# Patient Record
Sex: FEMALE | Race: WHITE | NOT HISPANIC OR LATINO | Employment: FULL TIME | URBAN - NONMETROPOLITAN AREA
[De-identification: names, ages, dates, MRNs, and addresses within clinical notes are randomized per-mention and may not be internally consistent; named-entity substitution may affect disease eponyms.]

---
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
## 2023-02-03 ENCOUNTER — OFFICE VISIT (OUTPATIENT)
Dept: FAMILY MEDICINE CLINIC | Facility: CLINIC | Age: 18
End: 2023-02-03
Payer: COMMERCIAL

## 2023-02-03 VITALS
DIASTOLIC BLOOD PRESSURE: 60 MMHG | BODY MASS INDEX: 25.44 KG/M2 | HEIGHT: 64 IN | SYSTOLIC BLOOD PRESSURE: 118 MMHG | OXYGEN SATURATION: 98 % | TEMPERATURE: 97.8 F | WEIGHT: 149 LBS | RESPIRATION RATE: 16 BRPM | HEART RATE: 97 BPM

## 2023-02-03 DIAGNOSIS — G43.009 MIGRAINE WITHOUT AURA AND WITHOUT STATUS MIGRAINOSUS, NOT INTRACTABLE: ICD-10-CM

## 2023-02-03 PROCEDURE — 99213 OFFICE O/P EST LOW 20 MIN: CPT | Performed by: NURSE PRACTITIONER

## 2023-02-03 RX ORDER — VENLAFAXINE HYDROCHLORIDE 75 MG/1
75 CAPSULE, EXTENDED RELEASE ORAL DAILY
Qty: 30 CAPSULE | Refills: 0 | Status: SHIPPED | OUTPATIENT
Start: 2023-02-03 | End: 2023-03-03

## 2023-02-03 RX ORDER — VENLAFAXINE HYDROCHLORIDE 37.5 MG/1
37.5 CAPSULE, EXTENDED RELEASE ORAL DAILY
Qty: 90 CAPSULE | Refills: 0 | Status: CANCELLED | OUTPATIENT
Start: 2023-02-03

## 2023-02-03 NOTE — PROGRESS NOTES
Chief Complaint  Migraine    Subjective          Kellie Neville is a 17 y.o. female who presents today to [facility] for follow up    HPI:   History of Present Illness    Presents to clinic for follow-up for migraines.  Is currently taking Effexor and reports this has helped some but she is still having some migraines. Migraines are less intense with Effexor.  Denies migraine or any associated symptoms at this time.  No other issues or concerns at this time.      The following portions of the patient's history were reviewed and updated as appropriate: allergies, current medications, past family history, past medical history, past social history, past surgical history and problem list.    Objective     Allergy:   Allergies   Allergen Reactions   • Grapefruit Swelling        Current Medications:   Current Outpatient Medications   Medication Sig Dispense Refill   • naproxen (Naprosyn) 500 MG tablet Take 1 tablet by mouth 2 (Two) Times a Day With Meals. (Patient taking differently: Take 500 mg by mouth 2 (Two) Times a Day As Needed.) 60 tablet 2   • venlafaxine XR (Effexor XR) 75 MG 24 hr capsule Take 1 capsule by mouth Daily. 30 capsule 0     No current facility-administered medications for this visit.       Past Medical History:  Past Medical History:   Diagnosis Date   • Migraine        Past Surgical History:  Past Surgical History:   Procedure Laterality Date   • NO PAST SURGERIES         Social History:  Social History     Socioeconomic History   • Marital status: Single   Tobacco Use   • Smoking status: Never   • Smokeless tobacco: Never   Vaping Use   • Vaping Use: Every day   • Substances: Nicotine, Flavoring   Substance and Sexual Activity   • Alcohol use: No   • Drug use: No   • Sexual activity: Defer       Family History:  Family History   Problem Relation Age of Onset   • No Known Problems Mother    • No Known Problems Father    • Diabetes Maternal Grandfather        Review  "of Systems:  Review of Systems   Eyes: Negative for visual disturbance.   Neurological: Negative for headaches.   Psychiatric/Behavioral: Negative for dysphoric mood, self-injury and suicidal ideas. The patient is not nervous/anxious.        Vital Signs:   /60 (BP Location: Right arm, Patient Position: Sitting, Cuff Size: Large Adult)   Pulse 97   Temp 97.8 °F (36.6 °C) (Temporal)   Resp 16   Ht 161.3 cm (63.5\")   Wt 67.6 kg (149 lb)   SpO2 98%   BMI 25.98 kg/m²      Physical Exam:  Physical Exam  Vitals and nursing note reviewed.   Constitutional:       General: She is not in acute distress.     Appearance: Normal appearance. She is not ill-appearing or toxic-appearing.   HENT:      Head: Normocephalic.   Eyes:      Pupils: Pupils are equal, round, and reactive to light.   Cardiovascular:      Rate and Rhythm: Normal rate and regular rhythm.      Heart sounds: Normal heart sounds.   Pulmonary:      Effort: Pulmonary effort is normal. No respiratory distress.      Breath sounds: Normal breath sounds.   Skin:     General: Skin is warm and dry.      Coloration: Skin is not pale.   Neurological:      General: No focal deficit present.      Mental Status: She is alert and oriented to person, place, and time.   Psychiatric:         Mood and Affect: Mood normal.         Behavior: Behavior normal.         Thought Content: Thought content normal.         Judgment: Judgment normal.                  Assessment and Plan   Diagnoses and all orders for this visit:    1. Migraine without aura and without status migrainosus, not intractable  -     venlafaxine XR (Effexor XR) 75 MG 24 hr capsule; Take 1 capsule by mouth Daily.  Dispense: 30 capsule; Refill: 0    Effexor dose increased to 75 mg daily for prophylactic treatment of migraines.    Discussed possible differential diagnoses, testing, treatment, recommended non-pharmacological interventions, risks, warning signs to monitor for that would indicate need for " follow-up in clinic or ER. If no improvement with these regimens or you have new or worsening symptoms follow-up. Patient verbalizes understanding and agreement with plan of care. Denies further needs or concerns.     Patient was given instructions and counseling regarding her condition and for health maintenance advised.      I spent 20 minutes caring for patient on this date of service. This time includes time spent by me in the following activities: preparing for the visit, reviewing tests, obtaining and/or reviewing a separately obtained history, performing a medically appropriate examination and/or evaluation, counseling and educating the patient/family/caregiver, ordering medications, tests, or procedures and documenting information in the medical record    Meds ordered during this visit:  New Medications Ordered This Visit   Medications   • venlafaxine XR (Effexor XR) 75 MG 24 hr capsule     Sig: Take 1 capsule by mouth Daily.     Dispense:  30 capsule     Refill:  0       Patient Instructions:  Patient instructions given for the following visit diagnosis:    ICD-10-CM ICD-9-CM   1. Migraine without aura and without status migrainosus, not intractable  G43.009 346.10       Follow Up   Return in about 1 month (around 3/3/2023) for Recheck, Sooner if needed.        This document has been electronically signed by FANG Hewitt  February 3, 2023 08:51 EST    Patient was given instructions and counseling regarding her condition or for health maintenance advice. Please see specific information pulled into the AVS if appropriate.     Part of this note may be an electronic transcription/translation of spoken language to printed text using the Dragon Dictation System.

## 2023-02-10 ENCOUNTER — OFFICE VISIT (OUTPATIENT)
Dept: FAMILY MEDICINE CLINIC | Facility: CLINIC | Age: 18
End: 2023-02-10
Payer: COMMERCIAL

## 2023-02-10 VITALS
HEART RATE: 83 BPM | HEIGHT: 64 IN | BODY MASS INDEX: 25.44 KG/M2 | DIASTOLIC BLOOD PRESSURE: 54 MMHG | SYSTOLIC BLOOD PRESSURE: 96 MMHG | WEIGHT: 149 LBS | RESPIRATION RATE: 16 BRPM | TEMPERATURE: 98.6 F | OXYGEN SATURATION: 98 %

## 2023-02-10 DIAGNOSIS — N92.6 MISSED PERIOD: ICD-10-CM

## 2023-02-10 DIAGNOSIS — R10.9 LEFT FLANK PAIN: ICD-10-CM

## 2023-02-10 DIAGNOSIS — Z30.431 ENCOUNTER FOR ROUTINE CHECKING OF INTRAUTERINE CONTRACEPTIVE DEVICE (IUD): Primary | ICD-10-CM

## 2023-02-10 LAB
B-HCG UR QL: NEGATIVE
BILIRUB BLD-MCNC: NEGATIVE MG/DL
CLARITY, POC: CLEAR
COLOR UR: YELLOW
EXPIRATION DATE: NORMAL
GLUCOSE UR STRIP-MCNC: NEGATIVE MG/DL
INTERNAL NEGATIVE CONTROL: NORMAL
INTERNAL POSITIVE CONTROL: NORMAL
KETONES UR QL: NEGATIVE
LEUKOCYTE EST, POC: NEGATIVE
Lab: NORMAL
NITRITE UR-MCNC: NEGATIVE MG/ML
PH UR: 6 [PH] (ref 5–8)
PROT UR STRIP-MCNC: NEGATIVE MG/DL
RBC # UR STRIP: NEGATIVE /UL
SP GR UR: 1.01 (ref 1–1.03)
UROBILINOGEN UR QL: NORMAL

## 2023-02-10 PROCEDURE — 81025 URINE PREGNANCY TEST: CPT | Performed by: NURSE PRACTITIONER

## 2023-02-10 PROCEDURE — 99214 OFFICE O/P EST MOD 30 MIN: CPT | Performed by: NURSE PRACTITIONER

## 2023-02-10 NOTE — PROGRESS NOTES
Chief Complaint  Contraception    Subjective          Kellie Neville is a 17 y.o. female who presents today to [facility] for initial evaluation     HPI:   History of Present Illness    Presents for follow up for IUD check. Reports she had a period in November and December but has not had one since. Reports she has not been able to feel her IUD strings. Had one episode of left flank pain yesterday but it only lasted a few seconds and went away. Denies any other associated symptoms. No other issues or concerns at this time.     The following portions of the patient's history were reviewed and updated as appropriate: allergies, current medications, past family history, past medical history, past social history, past surgical history and problem list.    Objective     Allergy:   Allergies   Allergen Reactions   • Grapefruit Swelling        Current Medications:   Current Outpatient Medications   Medication Sig Dispense Refill   • naproxen (Naprosyn) 500 MG tablet Take 1 tablet by mouth 2 (Two) Times a Day With Meals. (Patient taking differently: Take 500 mg by mouth 2 (Two) Times a Day As Needed.) 60 tablet 2   • venlafaxine XR (Effexor XR) 75 MG 24 hr capsule Take 1 capsule by mouth Daily. 30 capsule 0     No current facility-administered medications for this visit.       Past Medical History:  Past Medical History:   Diagnosis Date   • Migraine        Past Surgical History:  Past Surgical History:   Procedure Laterality Date   • NO PAST SURGERIES         Social History:  Social History     Socioeconomic History   • Marital status: Single   Tobacco Use   • Smoking status: Never   • Smokeless tobacco: Never   Vaping Use   • Vaping Use: Every day   • Substances: Nicotine, Flavoring   Substance and Sexual Activity   • Alcohol use: No   • Drug use: No   • Sexual activity: Defer       Family History:  Family History   Problem Relation Age of Onset   • No Known Problems Mother    • No Known  "Problems Father    • Diabetes Maternal Grandfather        Review of Systems:  Review of Systems   Gastrointestinal: Negative for abdominal pain.   Genitourinary: Negative for pelvic pain, vaginal discharge and vaginal pain.       Vital Signs:   BP 96/54 (BP Location: Right arm, Patient Position: Sitting, Cuff Size: Large Adult)   Pulse 83   Temp 98.6 °F (37 °C) (Temporal)   Resp 16   Ht 161.3 cm (63.5\")   Wt 67.6 kg (149 lb)   SpO2 98%   BMI 25.98 kg/m²      Physical Exam:  Physical Exam  Vitals and nursing note reviewed. Exam conducted with a chaperone present.   Constitutional:       General: She is not in acute distress.     Appearance: Normal appearance. She is not ill-appearing or toxic-appearing.   HENT:      Head: Normocephalic.   Cardiovascular:      Rate and Rhythm: Normal rate and regular rhythm.      Heart sounds: Normal heart sounds.   Pulmonary:      Effort: Pulmonary effort is normal. No respiratory distress.      Breath sounds: Normal breath sounds.   Abdominal:      General: Bowel sounds are normal.      Palpations: Abdomen is soft.   Genitourinary:     General: Normal vulva.      Vagina: Normal.      Cervix: Normal.      Uterus: Normal.       Adnexa: Right adnexa normal.      Comments: IDU strings not visible   Skin:     General: Skin is warm and dry.      Coloration: Skin is not pale.   Neurological:      Mental Status: She is alert and oriented to person, place, and time.   Psychiatric:         Mood and Affect: Mood normal.         Behavior: Behavior normal.         Thought Content: Thought content normal.         Judgment: Judgment normal.           Pelvic exam completed without incident. Chaperone present for exam.       Assessment and Plan   Diagnoses and all orders for this visit:    1. Encounter for routine checking of intrauterine contraceptive device (IUD) (Primary)  -     US Non-ob Transvaginal; Future  -     POCT pregnancy, urine    2. Missed period  -     POCT pregnancy, " urine    3. Left flank pain  -     POCT pregnancy, urine  -     POC Urinalysis Dipstick      IUD strings not visible. Will proceed with US for further evaluation of IUD placement.  Flank pain resolved.  Follow-up if symptoms return. Declines further testing.     Discussed possible differential diagnoses, testing, treatment, recommended non-pharmacological interventions, risks, warning signs to monitor for that would indicate need for follow-up in clinic or ER. If no improvement with these regimens or you have new or worsening symptoms follow-up. Patient verbalizes understanding and agreement with plan of care. Denies further needs or concerns.     Patient was given instructions and counseling regarding her condition and for health maintenance advised.    I spent 30 minutes caring for patient on this date of service. This time includes time spent by me in the following activities: preparing for the visit, reviewing tests, obtaining and/or reviewing a separately obtained history, performing a medically appropriate examination and/or evaluation, counseling and educating the patient/family/caregiver, ordering medications, tests, or procedures and documenting information in the medical record    Meds ordered during this visit:  No orders of the defined types were placed in this encounter.      Patient Instructions:  Patient instructions given for the following visit diagnosis:    ICD-10-CM ICD-9-CM   1. Encounter for routine checking of intrauterine contraceptive device (IUD)  Z30.431 V25.42   2. Missed period  N92.6 626.4   3. Left flank pain  R10.9 789.09       Follow Up   Return if symptoms worsen or fail to improve.        This document has been electronically signed by FANG Hewitt  February 10, 2023 12:35 EST    Patient was given instructions and counseling regarding her condition or for health maintenance advice. Please see specific information pulled into the AVS if appropriate.     Part of this note may be  an electronic transcription/translation of spoken language to printed text using the Dragon Dictation System.

## 2023-03-02 DIAGNOSIS — G43.809 OTHER MIGRAINE WITHOUT STATUS MIGRAINOSUS, NOT INTRACTABLE: ICD-10-CM

## 2023-03-02 RX ORDER — NAPROXEN 500 MG/1
TABLET ORAL
Qty: 60 TABLET | Refills: 2 | Status: SHIPPED | OUTPATIENT
Start: 2023-03-02

## 2023-03-02 RX ORDER — ONDANSETRON 4 MG/1
4 TABLET, ORALLY DISINTEGRATING ORAL EVERY 8 HOURS PRN
Qty: 15 TABLET | Refills: 0 | Status: SHIPPED | OUTPATIENT
Start: 2023-03-02

## 2023-03-02 NOTE — H&P
Patient presented complaining of nausea that occurs with taking her Effexor.  Reports this started since the dose was increased.  Requesting Zofran.  Denies any associated symptoms.  No other issues or concerns.  Does not wish to change medication at this time.  If no improvement of symptoms, follow-up for further evaluation/management.

## 2023-03-03 ENCOUNTER — TELEPHONE (OUTPATIENT)
Dept: FAMILY MEDICINE CLINIC | Facility: CLINIC | Age: 18
End: 2023-03-03

## 2023-03-03 ENCOUNTER — OFFICE VISIT (OUTPATIENT)
Dept: FAMILY MEDICINE CLINIC | Facility: CLINIC | Age: 18
End: 2023-03-03
Payer: COMMERCIAL

## 2023-03-03 VITALS
BODY MASS INDEX: 25.61 KG/M2 | SYSTOLIC BLOOD PRESSURE: 108 MMHG | HEIGHT: 64 IN | WEIGHT: 150 LBS | HEART RATE: 86 BPM | RESPIRATION RATE: 16 BRPM | OXYGEN SATURATION: 99 % | TEMPERATURE: 98 F | DIASTOLIC BLOOD PRESSURE: 60 MMHG

## 2023-03-03 DIAGNOSIS — R11.0 NAUSEA: ICD-10-CM

## 2023-03-03 DIAGNOSIS — G43.809 OTHER MIGRAINE WITHOUT STATUS MIGRAINOSUS, NOT INTRACTABLE: Primary | ICD-10-CM

## 2023-03-03 PROCEDURE — 99214 OFFICE O/P EST MOD 30 MIN: CPT | Performed by: NURSE PRACTITIONER

## 2023-03-03 RX ORDER — VENLAFAXINE HYDROCHLORIDE 37.5 MG/1
37.5 CAPSULE, EXTENDED RELEASE ORAL DAILY
Qty: 90 CAPSULE | Refills: 0 | Status: SHIPPED | OUTPATIENT
Start: 2023-03-03

## 2023-03-03 NOTE — PROGRESS NOTES
Chief Complaint  Migraine (Follow Up)    Subjective          Kellie Neville is a 17 y.o. female who presents today to [facility] for follow up    HPI:   History of Present Illness    Presents to clinic for follow-up for migraine.  Is currently treated with Effexor 75 mg daily.  Reports this has started to make her feel dizzy and nauseous.  Denies any other associated symptoms.  Reports she has not taken this today and feels better.  Took Zofran yesterday which helped with nausea.  No other issues or concerns at this time.      The following portions of the patient's history were reviewed and updated as appropriate: allergies, current medications, past family history, past medical history, past social history, past surgical history and problem list.    Objective     Allergy:   Allergies   Allergen Reactions   • Grapefruit Swelling        Current Medications:   Current Outpatient Medications   Medication Sig Dispense Refill   • naproxen (NAPROSYN) 500 MG tablet TAKE ONE TABLET BY MOUTH TWICE DAILY WITH MEALS FOR PAIN AND INFLAMMATION 60 tablet 2   • ondansetron ODT (ZOFRAN-ODT) 4 MG disintegrating tablet Place 1 tablet on the tongue Every 8 (Eight) Hours As Needed for Nausea or Vomiting. 15 tablet 0   • venlafaxine XR (Effexor XR) 37.5 MG 24 hr capsule Take 1 capsule by mouth Daily. 90 capsule 0     No current facility-administered medications for this visit.       Past Medical History:  Past Medical History:   Diagnosis Date   • Migraine        Past Surgical History:  Past Surgical History:   Procedure Laterality Date   • NO PAST SURGERIES         Social History:  Social History     Socioeconomic History   • Marital status: Single   Tobacco Use   • Smoking status: Never   • Smokeless tobacco: Never   Vaping Use   • Vaping Use: Every day   • Substances: Nicotine, Flavoring   Substance and Sexual Activity   • Alcohol use: No   • Drug use: No   • Sexual activity: Defer       Family  "History:  Family History   Problem Relation Age of Onset   • No Known Problems Mother    • No Known Problems Father    • Diabetes Maternal Grandfather        Review of Systems:  Review of Systems   Constitutional: Negative for fever.   Gastrointestinal: Negative for abdominal pain, constipation, diarrhea and vomiting.   Neurological: Negative for syncope.       Vital Signs:   /60 (BP Location: Right arm, Patient Position: Sitting, Cuff Size: Adult)   Pulse 86   Temp 98 °F (36.7 °C) (Temporal)   Resp 16   Ht 161.3 cm (63.5\")   Wt 68 kg (150 lb)   SpO2 99%   BMI 26.15 kg/m²      Physical Exam:  Physical Exam  Vitals and nursing note reviewed.   Constitutional:       General: She is not in acute distress.     Appearance: Normal appearance. She is not ill-appearing or toxic-appearing.   HENT:      Head: Normocephalic.   Cardiovascular:      Rate and Rhythm: Normal rate and regular rhythm.      Heart sounds: Normal heart sounds.   Pulmonary:      Effort: Pulmonary effort is normal. No respiratory distress.      Breath sounds: Normal breath sounds.   Abdominal:      General: Bowel sounds are normal.      Palpations: Abdomen is soft.   Skin:     General: Skin is warm and dry.      Coloration: Skin is not pale.   Neurological:      General: No focal deficit present.      Mental Status: She is alert and oriented to person, place, and time.   Psychiatric:         Mood and Affect: Mood normal.         Behavior: Behavior normal.         Thought Content: Thought content normal.         Judgment: Judgment normal.                  Assessment and Plan   Diagnoses and all orders for this visit:    1. Other migraine without status migrainosus, not intractable (Primary)  -     venlafaxine XR (Effexor XR) 37.5 MG 24 hr capsule; Take 1 capsule by mouth Daily.  Dispense: 90 capsule; Refill: 0    2. Nausea    Patient would like to do trial of decreasing Effexor to 37.5 mg daily.  Resolved at this time.  Brat diet, PRN. " Declines any testing.  Follow-up if symptoms return.    Discussed possible differential diagnoses, testing, treatment, recommended non-pharmacological interventions, risks, warning signs to monitor for that would indicate need for follow-up in clinic or ER. If no improvement with these regimens or you have new or worsening symptoms follow-up. Patient verbalizes understanding and agreement with plan of care. Denies further needs or concerns.     Patient was given instructions and counseling regarding her condition and for health maintenance advised.    I spent 30 minutes caring for patient on this date of service. This time includes time spent by me in the following activities: preparing for the visit, reviewing tests, obtaining and/or reviewing a separately obtained history, performing a medically appropriate examination and/or evaluation, counseling and educating the patient/family/caregiver, ordering medications, tests, or procedures and documenting information in the medical record    Meds ordered during this visit:  New Medications Ordered This Visit   Medications   • venlafaxine XR (Effexor XR) 37.5 MG 24 hr capsule     Sig: Take 1 capsule by mouth Daily.     Dispense:  90 capsule     Refill:  0       Patient Instructions:  Patient instructions given for the following visit diagnosis:    ICD-10-CM ICD-9-CM   1. Other migraine without status migrainosus, not intractable  G43.809 346.80   2. Nausea  R11.0 787.02       Follow Up   Return in about 1 month (around 4/3/2023) for Recheck, Sooner if needed.        This document has been electronically signed by FANG Hewitt  March 3, 2023 10:06 EST    Patient was given instructions and counseling regarding her condition or for health maintenance advice. Please see specific information pulled into the AVS if appropriate.     Part of this note may be an electronic transcription/translation of spoken language to printed text using the Dragon Dictation System.

## 2023-04-07 ENCOUNTER — OFFICE VISIT (OUTPATIENT)
Dept: FAMILY MEDICINE CLINIC | Facility: CLINIC | Age: 18
End: 2023-04-07
Payer: COMMERCIAL

## 2023-04-07 VITALS
DIASTOLIC BLOOD PRESSURE: 58 MMHG | HEIGHT: 64 IN | OXYGEN SATURATION: 100 % | WEIGHT: 152.6 LBS | TEMPERATURE: 98.4 F | BODY MASS INDEX: 26.05 KG/M2 | SYSTOLIC BLOOD PRESSURE: 104 MMHG | RESPIRATION RATE: 16 BRPM | HEART RATE: 80 BPM

## 2023-04-07 DIAGNOSIS — M25.473 ANKLE SWELLING, UNSPECIFIED LATERALITY: Primary | ICD-10-CM

## 2023-04-07 DIAGNOSIS — G43.809 OTHER MIGRAINE WITHOUT STATUS MIGRAINOSUS, NOT INTRACTABLE: ICD-10-CM

## 2023-04-07 DIAGNOSIS — R11.0 NAUSEA: ICD-10-CM

## 2023-04-07 PROCEDURE — 99214 OFFICE O/P EST MOD 30 MIN: CPT | Performed by: NURSE PRACTITIONER

## 2023-04-07 PROCEDURE — 1160F RVW MEDS BY RX/DR IN RCRD: CPT | Performed by: NURSE PRACTITIONER

## 2023-04-07 PROCEDURE — 1159F MED LIST DOCD IN RCRD: CPT | Performed by: NURSE PRACTITIONER

## 2023-04-07 NOTE — PROGRESS NOTES
Chief Complaint  Ankle Swelling    Subjective          Kellie Neville is a 17 y.o. female who presents today to [facility] for follow up    HPI:   History of Present Illness      Presents for complaint of swelling to ankles. Reports this has been going on for a little while since she started working. Swelling comes on throughout the day and is better in the morning. Is the same in both ankles. Denies any associated symptoms or swelling elsewhere.      Effexor was decreased to 37.5 on 3/3/2023 due to it causing dizziness.  Today patient reports she is tolerating medication well with no issues or side effects.  Reports migraines are controlled with current regimen.  Nausea dizziness have resolved. Denies any other issues or concerns at this time.      The following portions of the patient's history were reviewed and updated as appropriate: allergies, current medications, past family history, past medical history, past social history, past surgical history and problem list.    Objective     Allergy:   Allergies   Allergen Reactions   • Grapefruit Swelling        Current Medications:   Current Outpatient Medications   Medication Sig Dispense Refill   • naproxen (NAPROSYN) 500 MG tablet TAKE ONE TABLET BY MOUTH TWICE DAILY WITH MEALS FOR PAIN AND INFLAMMATION 60 tablet 2   • ondansetron ODT (ZOFRAN-ODT) 4 MG disintegrating tablet Place 1 tablet on the tongue Every 8 (Eight) Hours As Needed for Nausea or Vomiting. 15 tablet 0   • venlafaxine XR (Effexor XR) 37.5 MG 24 hr capsule Take 1 capsule by mouth Daily. 90 capsule 0     No current facility-administered medications for this visit.       Past Medical History:  Past Medical History:   Diagnosis Date   • Migraine        Past Surgical History:  Past Surgical History:   Procedure Laterality Date   • NO PAST SURGERIES         Social History:  Social History     Socioeconomic History   • Marital status: Single   Tobacco Use   • Smoking  "status: Never   • Smokeless tobacco: Never   Vaping Use   • Vaping Use: Every day   • Substances: Nicotine, Flavoring   Substance and Sexual Activity   • Alcohol use: No   • Drug use: No   • Sexual activity: Defer       Family History:  Family History   Problem Relation Age of Onset   • No Known Problems Mother    • No Known Problems Father    • Diabetes Maternal Grandfather        Review of Systems:  Review of Systems   Constitutional: Negative for unexpected weight change.   Respiratory: Negative for shortness of breath.    Gastrointestinal: Negative for abdominal distention.   Genitourinary: Negative for difficulty urinating.   Psychiatric/Behavioral: Negative for self-injury and suicidal ideas.       Vital Signs:   /58 (BP Location: Right arm, Patient Position: Sitting, Cuff Size: Adult)   Pulse 80   Temp 98.4 °F (36.9 °C) (Temporal)   Resp 16   Ht 161.3 cm (63.5\")   Wt 69.2 kg (152 lb 9.6 oz)   SpO2 100%   BMI 26.61 kg/m²      Physical Exam:  Physical Exam  Vitals and nursing note reviewed.   Constitutional:       General: She is not in acute distress.     Appearance: Normal appearance. She is not ill-appearing or toxic-appearing.   HENT:      Head: Normocephalic.   Neck:      Vascular: No JVD.   Cardiovascular:      Rate and Rhythm: Normal rate and regular rhythm.      Pulses: Normal pulses.      Heart sounds: Normal heart sounds. No murmur heard.  Pulmonary:      Effort: Pulmonary effort is normal. No respiratory distress.      Breath sounds: Normal breath sounds.   Abdominal:      General: Bowel sounds are normal.      Palpations: Abdomen is soft.   Musculoskeletal:         General: No swelling.   Skin:     General: Skin is warm and dry.      Capillary Refill: Capillary refill takes less than 2 seconds.      Coloration: Skin is not pale.   Neurological:      Mental Status: She is alert and oriented to person, place, and time.   Psychiatric:         Mood and Affect: Mood normal.         Behavior: " Behavior normal.         Thought Content: Thought content normal.         Judgment: Judgment normal.                  Assessment and Plan   Diagnoses and all orders for this visit:    1. Ankle swelling, unspecified laterality (Primary)    2. Other migraine without status migrainosus, not intractable    3. Nausea    Low-sodium diet, compression stockings, elevate legs    Continue current regimen for migraines    Follow-up if symptoms return.    Discussed possible differential diagnoses, testing, treatment, recommended non-pharmacological interventions, risks, warning signs to monitor for that would indicate need for follow-up in clinic or ER. If no improvement with these regimens or you have new or worsening symptoms follow-up. Patient verbalizes understanding and agreement with plan of care. Denies further needs or concerns.     Patient was given instructions and counseling regarding her condition and for health maintenance advised.    BMI is >= 25 and <30. (Overweight) The following options were offered after discussion;: weight loss educational material (shared in after visit summary), exercise counseling/recommendations and nutrition counseling/recommendations       I spent 30 minutes caring for patient on this date of service. This time includes time spent by me in the following activities: preparing for the visit, reviewing tests, obtaining and/or reviewing a separately obtained history, performing a medically appropriate examination and/or evaluation, counseling and educating the patient/family/caregiver, ordering medications, tests, or procedures and documenting information in the medical record    Meds ordered during this visit:  No orders of the defined types were placed in this encounter.      Patient Instructions:  Patient instructions given for the following visit diagnosis:    ICD-10-CM ICD-9-CM   1. Ankle swelling, unspecified laterality  M25.473 719.07   2. Other migraine without status migrainosus,  not intractable  G43.809 346.80   3. Nausea  R11.0 787.02       Follow Up   Return in about 1 month (around 5/7/2023) for Recheck, Sooner if needed.        This document has been electronically signed by FANG Hewitt  April 7, 2023 08:26 EDT    Patient was given instructions and counseling regarding her condition or for health maintenance advice. Please see specific information pulled into the AVS if appropriate.     Part of this note may be an electronic transcription/translation of spoken language to printed text using the Dragon Dictation System.

## 2023-04-20 ENCOUNTER — OFFICE VISIT (OUTPATIENT)
Dept: FAMILY MEDICINE CLINIC | Facility: CLINIC | Age: 18
End: 2023-04-20
Payer: COMMERCIAL

## 2023-04-20 VITALS
TEMPERATURE: 98.4 F | SYSTOLIC BLOOD PRESSURE: 110 MMHG | WEIGHT: 153.9 LBS | BODY MASS INDEX: 26.27 KG/M2 | HEIGHT: 64 IN | HEART RATE: 92 BPM | OXYGEN SATURATION: 97 % | DIASTOLIC BLOOD PRESSURE: 64 MMHG

## 2023-04-20 DIAGNOSIS — G47.9 DIFFICULTY SLEEPING: Primary | ICD-10-CM

## 2023-04-20 PROCEDURE — 1160F RVW MEDS BY RX/DR IN RCRD: CPT | Performed by: NURSE PRACTITIONER

## 2023-04-20 PROCEDURE — 99213 OFFICE O/P EST LOW 20 MIN: CPT | Performed by: NURSE PRACTITIONER

## 2023-04-20 PROCEDURE — 1159F MED LIST DOCD IN RCRD: CPT | Performed by: NURSE PRACTITIONER

## 2023-04-20 RX ORDER — HYDROXYZINE HYDROCHLORIDE 25 MG/1
25 TABLET, FILM COATED ORAL NIGHTLY PRN
Qty: 90 TABLET | Refills: 0 | Status: SHIPPED | OUTPATIENT
Start: 2023-04-20

## 2023-04-20 NOTE — PROGRESS NOTES
Chief Complaint  Insomnia    Subjective          Kellie Neville is a 18 y.o. female who presents today to [facility] for follow up    HPI:   History of Present Illness    Presents for complaint of difficulty sleeping.  Reports this has been an ongoing issue for a long time.  Has trouble falling asleep and staying asleep.  Has not been falling asleep until around 3 AM.  Denies any associated symptoms.  Denies any other issues or concerns at this time.      The following portions of the patient's history were reviewed and updated as appropriate: allergies, current medications, past family history, past medical history, past social history, past surgical history and problem list.    Objective     Allergy:   Allergies   Allergen Reactions   • Grapefruit Swelling        Current Medications:   Current Outpatient Medications   Medication Sig Dispense Refill   • naproxen (NAPROSYN) 500 MG tablet TAKE ONE TABLET BY MOUTH TWICE DAILY WITH MEALS FOR PAIN AND INFLAMMATION 60 tablet 2   • ondansetron ODT (ZOFRAN-ODT) 4 MG disintegrating tablet Place 1 tablet on the tongue Every 8 (Eight) Hours As Needed for Nausea or Vomiting. 15 tablet 0   • venlafaxine XR (Effexor XR) 37.5 MG 24 hr capsule Take 1 capsule by mouth Daily. 90 capsule 0   • hydrOXYzine (ATARAX) 25 MG tablet Take 1 tablet by mouth At Night As Needed (sleep). 90 tablet 0     No current facility-administered medications for this visit.       Past Medical History:  Past Medical History:   Diagnosis Date   • Migraine        Past Surgical History:  Past Surgical History:   Procedure Laterality Date   • NO PAST SURGERIES         Social History:  Social History     Socioeconomic History   • Marital status: Single   Tobacco Use   • Smoking status: Never   • Smokeless tobacco: Never   Vaping Use   • Vaping Use: Every day   • Substances: Nicotine, Flavoring   Substance and Sexual Activity   • Alcohol use: No   • Drug use: No   • Sexual  "activity: Defer       Family History:  Family History   Problem Relation Age of Onset   • No Known Problems Mother    • No Known Problems Father    • Diabetes Maternal Grandfather        Review of Systems:  Review of Systems   Cardiovascular: Negative for leg swelling.   Gastrointestinal: Negative for nausea and vomiting.   Psychiatric/Behavioral: Negative for self-injury and suicidal ideas.       Vital Signs:   /64 (BP Location: Right arm, Patient Position: Sitting)   Pulse 92   Temp 98.4 °F (36.9 °C)   Ht 162.6 cm (64\")   Wt 69.8 kg (153 lb 14.4 oz)   SpO2 97%   BMI 26.42 kg/m²      Physical Exam:  Physical Exam  Vitals and nursing note reviewed.   Constitutional:       General: She is not in acute distress.     Appearance: Normal appearance. She is not ill-appearing or toxic-appearing.   HENT:      Head: Normocephalic.   Cardiovascular:      Rate and Rhythm: Normal rate and regular rhythm.      Heart sounds: Normal heart sounds.   Pulmonary:      Effort: Pulmonary effort is normal. No respiratory distress.      Breath sounds: Normal breath sounds.   Skin:     General: Skin is warm and dry.      Coloration: Skin is not pale.   Neurological:      Mental Status: She is alert and oriented to person, place, and time.   Psychiatric:         Mood and Affect: Mood normal.         Behavior: Behavior normal.         Thought Content: Thought content normal.         Judgment: Judgment normal.                  Assessment and Plan   Diagnoses and all orders for this visit:    1. Difficulty sleeping (Primary)  -     hydrOXYzine (ATARAX) 25 MG tablet; Take 1 tablet by mouth At Night As Needed (sleep).  Dispense: 90 tablet; Refill: 0    Will initiate trial of hydroxyzine  Sleep schedule and other interventions as discussed to assist with sleep regimen      Discussed possible differential diagnoses, testing, treatment, recommended non-pharmacological interventions, risks, warning signs to monitor for that would " indicate need for follow-up in clinic or ER. If no improvement with these regimens or you have new or worsening symptoms follow-up. Patient verbalizes understanding and agreement with plan of care. Denies further needs or concerns.     Patient was given instructions and counseling regarding her condition and for health maintenance advised.    BMI is >= 25 and <30. (Overweight) The following options were offered after discussion;: weight loss educational material (shared in after visit summary), exercise counseling/recommendations and nutrition counseling/recommendations       I spent 20 minutes caring for patient on this date of service. This time includes time spent by me in the following activities: preparing for the visit, reviewing tests, obtaining and/or reviewing a separately obtained history, performing a medically appropriate examination and/or evaluation, counseling and educating the patient/family/caregiver, ordering medications, tests, or procedures and documenting information in the medical record    Meds ordered during this visit:  New Medications Ordered This Visit   Medications   • hydrOXYzine (ATARAX) 25 MG tablet     Sig: Take 1 tablet by mouth At Night As Needed (sleep).     Dispense:  90 tablet     Refill:  0       Patient Instructions:  Patient instructions given for the following visit diagnosis:    ICD-10-CM ICD-9-CM   1. Difficulty sleeping  G47.9 780.50       Follow Up   Return in about 3 months (around 7/20/2023) for Recheck , Sooner if needed.        This document has been electronically signed by FANG Hewitt  April 20, 2023 08:12 EDT    Patient was given instructions and counseling regarding her condition or for health maintenance advice. Please see specific information pulled into the AVS if appropriate.     Part of this note may be an electronic transcription/translation of spoken language to printed text using the Dragon Dictation System.

## 2023-05-22 ENCOUNTER — HOSPITAL ENCOUNTER (EMERGENCY)
Facility: HOSPITAL | Age: 18
Discharge: HOME | End: 2023-05-22
Attending: STUDENT IN AN ORGANIZED HEALTH CARE EDUCATION/TRAINING PROGRAM | Admitting: STUDENT IN AN ORGANIZED HEALTH CARE EDUCATION/TRAINING PROGRAM
Payer: COMMERCIAL

## 2023-05-22 VITALS
HEIGHT: 64 IN | SYSTOLIC BLOOD PRESSURE: 125 MMHG | WEIGHT: 153 LBS | BODY MASS INDEX: 26.12 KG/M2 | TEMPERATURE: 97.7 F | DIASTOLIC BLOOD PRESSURE: 70 MMHG | OXYGEN SATURATION: 98 % | HEART RATE: 102 BPM | RESPIRATION RATE: 17 BRPM

## 2023-05-22 DIAGNOSIS — K04.7 DENTAL ABSCESS: Primary | ICD-10-CM

## 2023-05-22 PROCEDURE — 96372 THER/PROPH/DIAG INJ SC/IM: CPT

## 2023-05-22 PROCEDURE — 25010000002 DEXAMETHASONE SODIUM PHOSPHATE 10 MG/ML SOLUTION: Performed by: STUDENT IN AN ORGANIZED HEALTH CARE EDUCATION/TRAINING PROGRAM

## 2023-05-22 PROCEDURE — 99283 EMERGENCY DEPT VISIT LOW MDM: CPT

## 2023-05-22 PROCEDURE — 25010000002 KETOROLAC TROMETHAMINE PER 15 MG: Performed by: STUDENT IN AN ORGANIZED HEALTH CARE EDUCATION/TRAINING PROGRAM

## 2023-05-22 RX ORDER — DEXAMETHASONE SODIUM PHOSPHATE 10 MG/ML
5 INJECTION, SOLUTION INTRAMUSCULAR; INTRAVENOUS ONCE
Status: COMPLETED | OUTPATIENT
Start: 2023-05-22 | End: 2023-05-22

## 2023-05-22 RX ORDER — CLINDAMYCIN HYDROCHLORIDE 150 MG/1
300 CAPSULE ORAL ONCE
Status: COMPLETED | OUTPATIENT
Start: 2023-05-22 | End: 2023-05-22

## 2023-05-22 RX ORDER — KETOROLAC TROMETHAMINE 30 MG/ML
30 INJECTION, SOLUTION INTRAMUSCULAR; INTRAVENOUS ONCE
Status: COMPLETED | OUTPATIENT
Start: 2023-05-22 | End: 2023-05-22

## 2023-05-22 RX ORDER — CLINDAMYCIN HYDROCHLORIDE 300 MG/1
300 CAPSULE ORAL 3 TIMES DAILY
Qty: 20 CAPSULE | Refills: 0 | Status: SHIPPED | OUTPATIENT
Start: 2023-05-22

## 2023-05-22 RX ADMIN — CLINDAMYCIN HYDROCHLORIDE 300 MG: 150 CAPSULE ORAL at 10:07

## 2023-05-22 RX ADMIN — KETOROLAC TROMETHAMINE 30 MG: 30 INJECTION, SOLUTION INTRAMUSCULAR; INTRAVENOUS at 10:07

## 2023-05-22 RX ADMIN — DEXAMETHASONE SODIUM PHOSPHATE 5 MG: 10 INJECTION INTRAMUSCULAR; INTRAVENOUS at 10:07

## 2023-05-22 NOTE — ED PROVIDER NOTES
Subjective   History of Present Illness  18-year-old female presents to the ER with upper and lower jaw pain status post wisdom tooth extraction x4 3 days prior to this presentation.  Patient noted worsening pain over the last several hours.  No fever or chills.  Patient has been using salt water wash/gargles for her symptoms.  No concerns for bleeding.  No shortness of breath.  No difficulty swallowing.  Bilateral jaw swelling noted.  No significant erythematous changes or exudative changes.  Vital signs stable        Review of Systems   HENT: Positive for dental problem.    All other systems reviewed and are negative.      Past Medical History:   Diagnosis Date   • Migraine        Allergies   Allergen Reactions   • Grapefruit Swelling       Past Surgical History:   Procedure Laterality Date   • NO PAST SURGERIES         Family History   Problem Relation Age of Onset   • No Known Problems Mother    • No Known Problems Father    • Diabetes Maternal Grandfather        Social History     Socioeconomic History   • Marital status: Single   Tobacco Use   • Smoking status: Never   • Smokeless tobacco: Never   Vaping Use   • Vaping Use: Every day   • Substances: Nicotine, Flavoring   Substance and Sexual Activity   • Alcohol use: No   • Drug use: No   • Sexual activity: Defer           Objective   Physical Exam  Constitutional:       General: She is not in acute distress.     Appearance: Normal appearance. She is not ill-appearing.   HENT:      Head: Normocephalic and atraumatic.      Comments: Bilateral jaw swelling likely secondary to postoperative changes.  No significant exudative changes within the oropharynx.  No surrounding erythema     Right Ear: External ear normal.      Left Ear: External ear normal.      Nose: Nose normal.      Mouth/Throat:      Mouth: Mucous membranes are moist.   Eyes:      Extraocular Movements: Extraocular movements intact.      Pupils: Pupils are equal, round, and reactive to light.    Cardiovascular:      Rate and Rhythm: Normal rate and regular rhythm.      Heart sounds: No murmur heard.  Pulmonary:      Effort: Pulmonary effort is normal. No respiratory distress.      Breath sounds: Normal breath sounds. No wheezing.   Abdominal:      General: Bowel sounds are normal.      Palpations: Abdomen is soft.      Tenderness: There is no abdominal tenderness.   Musculoskeletal:         General: No deformity or signs of injury. Normal range of motion.      Cervical back: Normal range of motion and neck supple.   Skin:     General: Skin is warm and dry.      Findings: No erythema.   Neurological:      General: No focal deficit present.      Mental Status: She is alert and oriented to person, place, and time. Mental status is at baseline.      Cranial Nerves: No cranial nerve deficit.   Psychiatric:         Mood and Affect: Mood normal.         Behavior: Behavior normal.         Thought Content: Thought content normal.         Procedures           ED Course                                           Medical Decision Making  Concerns for possible infection.  Patient will be started on clindamycin.  Toradol and Decadron given for pain.  No acute concerns for airway obstruction.  Patient is still able to tolerate p.o. intake.  Recommend close follow-up with dentist for further evaluation.  Work up and results were discussed throughly with the patient.  The patient will be discharged for further monitoring and management with their PCP.  Red flags, warning signs, worsening symptoms, and when to return to the ER discussed with and understood by the patient.  Patient will follow up with their PCP in a timely manner.  Vitals stable at discharge.    Dental abscess: complicated acute illness or injury  Risk  Prescription drug management.          Final diagnoses:   Dental abscess       ED Disposition  ED Disposition     ED Disposition   Discharge    Condition   Stable    Comment   --             Alee Lombardi,  APRN  [address] [place] [address]  [phone]    In 1 week      [facility] Emergency Department  [address] [facility] [address] [phone]    If symptoms worsen         Medication List      New Prescriptions    clindamycin 300 MG capsule  Commonly known as: CLEOCIN  Take 1 capsule by mouth 3 (Three) Times a Day.           Where to Get Your Medications      These medications were sent to [facility] [place], [place] - [address] [place] [address] [phone]  - [phone] [address] [address]    Phone: [phone]   · clindamycin 300 MG capsule          Michoacano Sharma DO  05/22/23 1027

## 2023-05-23 ENCOUNTER — TELEPHONE (OUTPATIENT)
Dept: FAMILY MEDICINE CLINIC | Facility: CLINIC | Age: 18
End: 2023-05-23
Payer: COMMERCIAL

## 2023-05-23 RX ORDER — IBUPROFEN 800 MG/1
800 TABLET ORAL EVERY 8 HOURS PRN
Qty: 60 TABLET | Refills: 0 | Status: SHIPPED | OUTPATIENT
Start: 2023-05-23

## 2023-05-23 RX ORDER — ONDANSETRON 4 MG/1
4 TABLET, ORALLY DISINTEGRATING ORAL EVERY 8 HOURS PRN
Qty: 15 TABLET | Refills: 0 | Status: SHIPPED | OUTPATIENT
Start: 2023-05-23

## 2023-09-08 DIAGNOSIS — G43.809 OTHER MIGRAINE WITHOUT STATUS MIGRAINOSUS, NOT INTRACTABLE: ICD-10-CM

## 2023-09-08 DIAGNOSIS — G47.9 DIFFICULTY SLEEPING: ICD-10-CM

## 2023-09-11 RX ORDER — HYDROXYZINE HYDROCHLORIDE 25 MG/1
25 TABLET, FILM COATED ORAL NIGHTLY PRN
Qty: 90 TABLET | Refills: 0 | Status: SHIPPED | OUTPATIENT
Start: 2023-09-11

## 2023-09-11 RX ORDER — VENLAFAXINE HYDROCHLORIDE 37.5 MG/1
37.5 CAPSULE, EXTENDED RELEASE ORAL DAILY
Qty: 90 CAPSULE | Refills: 0 | Status: SHIPPED | OUTPATIENT
Start: 2023-09-11

## 2023-12-21 ENCOUNTER — OFFICE VISIT (OUTPATIENT)
Dept: FAMILY MEDICINE CLINIC | Facility: CLINIC | Age: 18
End: 2023-12-21
Payer: COMMERCIAL

## 2023-12-21 VITALS
DIASTOLIC BLOOD PRESSURE: 78 MMHG | BODY MASS INDEX: 27.72 KG/M2 | WEIGHT: 162.4 LBS | TEMPERATURE: 97.8 F | OXYGEN SATURATION: 99 % | SYSTOLIC BLOOD PRESSURE: 122 MMHG | HEIGHT: 64 IN | HEART RATE: 94 BPM

## 2023-12-21 DIAGNOSIS — F41.9 ANXIETY: ICD-10-CM

## 2023-12-21 DIAGNOSIS — G43.009 MIGRAINE WITHOUT AURA AND WITHOUT STATUS MIGRAINOSUS, NOT INTRACTABLE: Primary | ICD-10-CM

## 2023-12-21 PROCEDURE — 99214 OFFICE O/P EST MOD 30 MIN: CPT | Performed by: FAMILY MEDICINE

## 2023-12-21 PROCEDURE — 1159F MED LIST DOCD IN RCRD: CPT | Performed by: FAMILY MEDICINE

## 2023-12-21 PROCEDURE — 1160F RVW MEDS BY RX/DR IN RCRD: CPT | Performed by: FAMILY MEDICINE

## 2023-12-21 RX ORDER — TOPIRAMATE 25 MG/1
25 TABLET ORAL 2 TIMES DAILY
Qty: 60 TABLET | Refills: 2 | Status: SHIPPED | OUTPATIENT
Start: 2023-12-21

## 2023-12-21 RX ORDER — ESCITALOPRAM OXALATE 10 MG/1
10 TABLET ORAL DAILY
Qty: 30 TABLET | Refills: 2 | Status: SHIPPED | OUTPATIENT
Start: 2023-12-21

## 2023-12-22 NOTE — PROGRESS NOTES
"Chief Complaint  Anxiety    Subjective          Kellie Neville presents to [facility]  Anxiety  Presents for follow-up visit. Symptoms include nervous/anxious behavior. Symptoms occur most days. The severity of symptoms is moderate.     Compliance with medications is % (States her anxiet has increased and medication is not helping. States she previously tried increasing and was not able to tolerate at a increased dose. would like to change medications).     States she needs a refill on her topamax, she is doing well with this at this time.     Review of Systems   Psychiatric/Behavioral:  The patient is nervous/anxious.          Objective   Vital Signs:   /78 (BP Location: Right arm, Patient Position: Sitting, Cuff Size: Adult)   Pulse 94   Temp 97.8 °F (36.6 °C) (Temporal)   Ht 162.6 cm (64\")   Wt 73.7 kg (162 lb 6.4 oz)   SpO2 99%   BMI 27.88 kg/m²     Physical Exam  Constitutional:       General: She is not in acute distress.     Appearance: Normal appearance. She is well-developed and well-groomed. She is not ill-appearing, toxic-appearing or diaphoretic.   HENT:      Head: Normocephalic.      Nose: Nose normal. No congestion or rhinorrhea.      Mouth/Throat:      Mouth: Mucous membranes are moist.      Pharynx: Oropharynx is clear. No oropharyngeal exudate or posterior oropharyngeal erythema.   Eyes:      General: Lids are normal.         Right eye: No discharge.         Left eye: No discharge.      Extraocular Movements: Extraocular movements intact.      Pupils: Pupils are equal, round, and reactive to light.   Neck:      Vascular: No carotid bruit.   Cardiovascular:      Rate and Rhythm: Normal rate and regular rhythm.      Pulses: Normal pulses.      Heart sounds: Normal heart sounds. No murmur heard.     No friction rub. No gallop.   Pulmonary:      Effort: Pulmonary effort is normal. No respiratory distress.      Breath sounds: Normal breath sounds. No " stridor. No wheezing, rhonchi or rales.   Chest:      Chest wall: No tenderness.   Abdominal:      General: Bowel sounds are normal. There is no distension.      Palpations: Abdomen is soft. There is no mass.      Tenderness: There is no abdominal tenderness. There is no right CVA tenderness, left CVA tenderness, guarding or rebound.      Hernia: No hernia is present.   Musculoskeletal:         General: No swelling or tenderness. Normal range of motion.      Cervical back: Normal range of motion and neck supple. No rigidity or tenderness.      Right lower leg: No edema.      Left lower leg: No edema.   Lymphadenopathy:      Cervical: No cervical adenopathy.   Skin:     General: Skin is warm.      Capillary Refill: Capillary refill takes less than 2 seconds.      Coloration: Skin is not jaundiced.      Findings: No bruising, erythema or rash.   Neurological:      General: No focal deficit present.      Mental Status: She is alert and oriented to person, place, and time.      Motor: Motor function is intact. No weakness.      Coordination: Coordination is intact.      Gait: Gait is intact. Gait normal.   Psychiatric:         Attention and Perception: Attention normal.         Mood and Affect: Mood normal.         Speech: Speech normal.         Behavior: Behavior normal.         Cognition and Memory: Cognition normal.         Judgment: Judgment normal.        Result Review :                 Assessment and Plan    There are no diagnoses linked to this encounter.  Patient's Body mass index is 27.88 kg/m². indicating that she is overweight (BMI 25-29.9). Patient's (Body mass index is 27.88 kg/m².) indicates that they are overweight with health conditions that include none . Weight is unchanged. BMI is is above average; BMI management plan is completed. We discussed low calorie, low carb based diet program, portion control, and increasing exercise. .    Follow Up   Return in about 4 weeks (around 1/18/2024), or if symptoms  worsen or fail to improve.  Patient was given instructions and counseling regarding her condition or for health maintenance advice. Please see specific information pulled into the AVS if appropriate.     This document has been electronically signed by FANG Woods  December 21, 2023 21:58 EST

## 2024-02-19 ENCOUNTER — OFFICE VISIT (OUTPATIENT)
Dept: FAMILY MEDICINE CLINIC | Facility: CLINIC | Age: 19
End: 2024-02-19
Payer: COMMERCIAL

## 2024-02-19 VITALS
WEIGHT: 162.7 LBS | TEMPERATURE: 97.7 F | DIASTOLIC BLOOD PRESSURE: 74 MMHG | HEIGHT: 64 IN | HEART RATE: 85 BPM | SYSTOLIC BLOOD PRESSURE: 116 MMHG | BODY MASS INDEX: 27.78 KG/M2 | OXYGEN SATURATION: 98 %

## 2024-02-19 DIAGNOSIS — F41.9 ANXIETY: ICD-10-CM

## 2024-02-19 DIAGNOSIS — G43.009 MIGRAINE WITHOUT AURA AND WITHOUT STATUS MIGRAINOSUS, NOT INTRACTABLE: ICD-10-CM

## 2024-02-19 PROCEDURE — 1160F RVW MEDS BY RX/DR IN RCRD: CPT | Performed by: FAMILY MEDICINE

## 2024-02-19 PROCEDURE — 1159F MED LIST DOCD IN RCRD: CPT | Performed by: FAMILY MEDICINE

## 2024-02-19 PROCEDURE — 99214 OFFICE O/P EST MOD 30 MIN: CPT | Performed by: FAMILY MEDICINE

## 2024-02-19 RX ORDER — TOPIRAMATE 25 MG/1
25 TABLET ORAL 2 TIMES DAILY
Qty: 60 TABLET | Refills: 2 | Status: SHIPPED | OUTPATIENT
Start: 2024-02-19

## 2024-02-19 RX ORDER — ESCITALOPRAM OXALATE 10 MG/1
10 TABLET ORAL DAILY
Qty: 30 TABLET | Refills: 2 | Status: SHIPPED | OUTPATIENT
Start: 2024-02-19

## 2024-02-19 NOTE — PROGRESS NOTES
"Chief Complaint  Migraine without aura and without status migrainosus, not i    Subjective          Kellie Neville presents to [facility]  History of Present Illness    Anxiety  Presents for follow-up visit. Symptoms include nervous/anxious behavior. Symptoms occur most days. The severity of symptoms is moderate.     Compliance with medications is % (States her anxiety is improve with medication).     States she needs a refill on her topamax, she is doing well with this at this time.     Review of Systems      Objective   Vital Signs:   /74 (BP Location: Right arm, Patient Position: Sitting, Cuff Size: Adult)   Pulse 85   Temp 97.7 °F (36.5 °C) (Temporal)   Ht 162.6 cm (64\")   Wt 73.8 kg (162 lb 11.2 oz)   SpO2 98%   BMI 27.93 kg/m²     Physical Exam  Constitutional:       General: She is not in acute distress.     Appearance: Normal appearance. She is well-developed and well-groomed. She is not ill-appearing, toxic-appearing or diaphoretic.   HENT:      Head: Normocephalic.      Nose: Nose normal. No congestion or rhinorrhea.      Mouth/Throat:      Mouth: Mucous membranes are moist.      Pharynx: Oropharynx is clear. No oropharyngeal exudate or posterior oropharyngeal erythema.   Eyes:      General: Lids are normal.         Right eye: No discharge.         Left eye: No discharge.      Extraocular Movements: Extraocular movements intact.      Pupils: Pupils are equal, round, and reactive to light.   Neck:      Vascular: No carotid bruit.   Cardiovascular:      Rate and Rhythm: Normal rate and regular rhythm.      Pulses: Normal pulses.      Heart sounds: Normal heart sounds. No murmur heard.     No friction rub. No gallop.   Pulmonary:      Effort: Pulmonary effort is normal. No respiratory distress.      Breath sounds: Normal breath sounds. No stridor. No wheezing, rhonchi or rales.   Chest:      Chest wall: No tenderness.   Abdominal:      General: Bowel sounds " are normal. There is no distension.      Palpations: Abdomen is soft. There is no mass.      Tenderness: There is no abdominal tenderness. There is no right CVA tenderness, left CVA tenderness, guarding or rebound.      Hernia: No hernia is present.   Musculoskeletal:         General: No swelling or tenderness. Normal range of motion.      Cervical back: Normal range of motion and neck supple. No rigidity or tenderness.      Right lower leg: No edema.      Left lower leg: No edema.   Lymphadenopathy:      Cervical: No cervical adenopathy.   Skin:     General: Skin is warm.      Capillary Refill: Capillary refill takes less than 2 seconds.      Coloration: Skin is not jaundiced.      Findings: No bruising, erythema or rash.   Neurological:      General: No focal deficit present.      Mental Status: She is alert and oriented to person, place, and time.      Motor: Motor function is intact. No weakness.      Coordination: Coordination is intact.      Gait: Gait is intact. Gait normal.   Psychiatric:         Attention and Perception: Attention normal.         Mood and Affect: Mood normal.         Speech: Speech normal.         Behavior: Behavior normal.         Cognition and Memory: Cognition normal.         Judgment: Judgment normal.        Result Review :                 Assessment and Plan    Diagnoses and all orders for this visit:    1. Anxiety  -     escitalopram (Lexapro) 10 MG tablet; Take 1 tablet by mouth Daily.  Dispense: 30 tablet; Refill: 2    2. Migraine without aura and without status migrainosus, not intractable  -     topiramate (Topamax) 25 MG tablet; Take 1 tablet by mouth 2 (Two) Times a Day.  Dispense: 60 tablet; Refill: 2      Patient's Body mass index is 27.93 kg/m². indicating that she is overweight (BMI 25-29.9). Patient's (Body mass index is 27.93 kg/m².) indicates that they are overweight with health conditions that include none . Weight is unchanged. BMI is is above average; BMI management  plan is completed. We discussed low calorie, low carb based diet program, portion control, and increasing exercise. .    Follow Up   Return in about 3 months (around 5/19/2024), or if symptoms worsen or fail to improve.  Patient was given instructions and counseling regarding her condition or for health maintenance advice. Please see specific information pulled into the AVS if appropriate.     This document has been electronically signed by FANG Woods  February 19, 2024 14:17 EST

## 2024-05-20 ENCOUNTER — OFFICE VISIT (OUTPATIENT)
Dept: FAMILY MEDICINE CLINIC | Facility: CLINIC | Age: 19
End: 2024-05-20
Payer: COMMERCIAL

## 2024-05-20 VITALS
OXYGEN SATURATION: 98 % | HEIGHT: 64 IN | DIASTOLIC BLOOD PRESSURE: 76 MMHG | TEMPERATURE: 98.9 F | BODY MASS INDEX: 28.58 KG/M2 | HEART RATE: 97 BPM | WEIGHT: 167.4 LBS | SYSTOLIC BLOOD PRESSURE: 106 MMHG

## 2024-05-20 DIAGNOSIS — G43.009 MIGRAINE WITHOUT AURA AND WITHOUT STATUS MIGRAINOSUS, NOT INTRACTABLE: ICD-10-CM

## 2024-05-20 DIAGNOSIS — F41.9 ANXIETY: ICD-10-CM

## 2024-05-20 PROCEDURE — 99214 OFFICE O/P EST MOD 30 MIN: CPT | Performed by: FAMILY MEDICINE

## 2024-05-20 PROCEDURE — 1159F MED LIST DOCD IN RCRD: CPT | Performed by: FAMILY MEDICINE

## 2024-05-20 PROCEDURE — 1160F RVW MEDS BY RX/DR IN RCRD: CPT | Performed by: FAMILY MEDICINE

## 2024-05-20 PROCEDURE — 1126F AMNT PAIN NOTED NONE PRSNT: CPT | Performed by: FAMILY MEDICINE

## 2024-05-20 RX ORDER — TOPIRAMATE 25 MG/1
25 TABLET ORAL 2 TIMES DAILY
Qty: 60 TABLET | Refills: 2 | Status: SHIPPED | OUTPATIENT
Start: 2024-05-20

## 2024-05-20 RX ORDER — ESCITALOPRAM OXALATE 10 MG/1
10 TABLET ORAL DAILY
Qty: 30 TABLET | Refills: 2 | Status: SHIPPED | OUTPATIENT
Start: 2024-05-20

## 2024-05-20 NOTE — PROGRESS NOTES
"Chief Complaint  Anxiety    Subjective          Kellie Neville presents to [facility]  Anxiety        Anxiety  Presents for follow-up visit. Symptoms include nervous/anxious behavior. Symptoms occur most days. The severity of symptoms is moderate.     Compliance with medications is % (States her anxiety is improve with medication).  Needs hydroxyzine as needed does not need refill at this time.    States she needs a refill on her topamax, she is doing well with this at this time.     Review of Systems      Objective   Vital Signs:   /76 (BP Location: Right arm, Patient Position: Sitting, Cuff Size: Adult)   Pulse 97   Temp 98.9 °F (37.2 °C) (Temporal)   Ht 162.6 cm (64\")   Wt 75.9 kg (167 lb 6.4 oz)   SpO2 98%   BMI 28.73 kg/m²     Physical Exam  Constitutional:       General: She is not in acute distress.     Appearance: Normal appearance. She is well-developed and well-groomed. She is not ill-appearing, toxic-appearing or diaphoretic.   HENT:      Head: Normocephalic.      Nose: Nose normal. No congestion or rhinorrhea.      Mouth/Throat:      Mouth: Mucous membranes are moist.      Pharynx: Oropharynx is clear. No oropharyngeal exudate or posterior oropharyngeal erythema.   Eyes:      General: Lids are normal.         Right eye: No discharge.         Left eye: No discharge.      Extraocular Movements: Extraocular movements intact.      Pupils: Pupils are equal, round, and reactive to light.   Neck:      Vascular: No carotid bruit.   Cardiovascular:      Rate and Rhythm: Normal rate and regular rhythm.      Pulses: Normal pulses.      Heart sounds: Normal heart sounds. No murmur heard.     No friction rub. No gallop.   Pulmonary:      Effort: Pulmonary effort is normal. No respiratory distress.      Breath sounds: Normal breath sounds. No stridor. No wheezing, rhonchi or rales.   Chest:      Chest wall: No tenderness.   Abdominal:      General: Bowel sounds are " normal. There is no distension.      Palpations: Abdomen is soft. There is no mass.      Tenderness: There is no abdominal tenderness. There is no right CVA tenderness, left CVA tenderness, guarding or rebound.      Hernia: No hernia is present.   Musculoskeletal:         General: No swelling or tenderness. Normal range of motion.      Cervical back: Normal range of motion and neck supple. No rigidity or tenderness.      Right lower leg: No edema.      Left lower leg: No edema.   Lymphadenopathy:      Cervical: No cervical adenopathy.   Skin:     General: Skin is warm.      Capillary Refill: Capillary refill takes less than 2 seconds.      Coloration: Skin is not jaundiced.      Findings: No bruising, erythema or rash.   Neurological:      General: No focal deficit present.      Mental Status: She is alert and oriented to person, place, and time.      Motor: Motor function is intact. No weakness.      Coordination: Coordination is intact.      Gait: Gait is intact. Gait normal.   Psychiatric:         Attention and Perception: Attention normal.         Mood and Affect: Mood normal.         Speech: Speech normal.         Behavior: Behavior normal.         Cognition and Memory: Cognition normal.         Judgment: Judgment normal.        Result Review :                 Assessment and Plan    Diagnoses and all orders for this visit:    1. Anxiety  -     escitalopram (Lexapro) 10 MG tablet; Take 1 tablet by mouth Daily.  Dispense: 30 tablet; Refill: 2    2. Migraine without aura and without status migrainosus, not intractable  -     topiramate (Topamax) 25 MG tablet; Take 1 tablet by mouth 2 (Two) Times a Day.  Dispense: 60 tablet; Refill: 2      Patient's Body mass index is 28.73 kg/m². indicating that she is overweight (BMI 25-29.9). Patient's (Body mass index is 28.73 kg/m².) indicates that they are overweight with health conditions that include none . Weight is unchanged. BMI is is above average; BMI management plan  is completed. We discussed low calorie, low carb based diet program, portion control, and increasing exercise. .    Follow Up   Return in about 3 months (around 8/20/2024), or if symptoms worsen or fail to improve.  Patient was given instructions and counseling regarding her condition or for health maintenance advice. Please see specific information pulled into the AVS if appropriate.     This document has been electronically signed by FANG Woods  May 20, 2024 14:28 EDT

## 2025-02-17 ENCOUNTER — OFFICE VISIT (OUTPATIENT)
Dept: FAMILY MEDICINE CLINIC | Facility: CLINIC | Age: 20
End: 2025-02-17
Payer: COMMERCIAL

## 2025-02-17 VITALS
HEIGHT: 64 IN | HEART RATE: 101 BPM | BODY MASS INDEX: 29.37 KG/M2 | TEMPERATURE: 98.2 F | OXYGEN SATURATION: 98 % | DIASTOLIC BLOOD PRESSURE: 78 MMHG | SYSTOLIC BLOOD PRESSURE: 118 MMHG | WEIGHT: 172 LBS

## 2025-02-17 DIAGNOSIS — R09.81 NASAL CONGESTION: Primary | ICD-10-CM

## 2025-02-17 LAB
EXPIRATION DATE: NORMAL
FLUAV AG UPPER RESP QL IA.RAPID: NOT DETECTED
FLUBV AG UPPER RESP QL IA.RAPID: NOT DETECTED
INTERNAL CONTROL: NORMAL
Lab: NORMAL
SARS-COV-2 AG UPPER RESP QL IA.RAPID: NOT DETECTED

## 2025-02-17 PROCEDURE — 1126F AMNT PAIN NOTED NONE PRSNT: CPT | Performed by: FAMILY MEDICINE

## 2025-02-17 PROCEDURE — 87428 SARSCOV & INF VIR A&B AG IA: CPT | Performed by: FAMILY MEDICINE

## 2025-02-17 PROCEDURE — 99213 OFFICE O/P EST LOW 20 MIN: CPT | Performed by: FAMILY MEDICINE

## 2025-02-17 RX ORDER — AZITHROMYCIN 250 MG/1
TABLET, FILM COATED ORAL
Qty: 6 TABLET | Refills: 0 | Status: SHIPPED | OUTPATIENT
Start: 2025-02-17

## 2025-02-18 NOTE — PROGRESS NOTES
"Chief Complaint  Nasal Congestion    Subjective          Kellie Neville presents to [facility]  History of Present Illness  History of Present Illness  The patient is a 19-year-old female who presents for evaluation of congestion.    She began experiencing throat discomfort on Thursday morning, which persisted for a day before evolving into nasal congestion, cough, and frequent sneezing. The sneezing has since subsided, but she continues to experience coughing and nasal stuffiness. Additionally, she reports chest pain associated with the cough. She has no known drug allergies and has previously tolerated Z-Dylan without any adverse reactions.    ALLERGIES  The patient has no known allergies.    Review of Systems      Objective   Vital Signs:   /78 (BP Location: Right arm, Patient Position: Sitting, Cuff Size: Adult)   Pulse 101   Temp 98.2 °F (36.8 °C) (Temporal)   Ht 162.6 cm (64\")   Wt 78 kg (172 lb)   SpO2 98%   BMI 29.52 kg/m²     Physical Exam  Throat was examined.  Lungs were auscultated.    Physical Exam  HENT:      Nose: Congestion and rhinorrhea present.        Result Review :          Results  Laboratory Studies  COVID-19 and influenza tests were negative.              Assessment and Plan    Diagnoses and all orders for this visit:    1. Nasal congestion (Primary)  -     POCT SARS-CoV-2 Antigen EVELIN + Flu    Other orders  -     azithromycin (Zithromax Z-Dylan) 250 MG tablet; Take 2 tablets by mouth on day 1, then 1 tablet daily on days 2-5  Dispense: 6 tablet; Refill: 0      Assessment & Plan  1. Upper respiratory infection.  Symptoms include a sore throat that lasted a day, followed by nasal congestion, sneezing, and a cough that causes chest pain. COVID-19 and influenza tests were negative. A prescription for Z-Dylan (azithromycin) has been issued, with instructions to take 2 tablets on the first day, followed by 1 tablet daily for the subsequent 4 " days.    Patient's Body mass index is 29.52 kg/m². indicating that she is overweight (BMI 25-29.9). Patient's (Body mass index is 29.52 kg/m².) indicates that they are overweight with health conditions that include none . Weight is unchanged. BMI is above average; BMI management plan is completed. We discussed low calorie, low carb based diet program, portion control, and increasing exercise. .    Follow Up   Return if symptoms worsen or fail to improve.  Patient was given instructions and counseling regarding her condition or for health maintenance advice. Please see specific information pulled into the AVS if appropriate.     This document has been electronically signed by FANG Woods  February 18, 2025 12:38 EST     Patient or patient representative verbalized consent for the use of Ambient Listening during the visit with  FANG Woods for chart documentation. 2/18/2025  12:58 EST

## 2025-04-29 ENCOUNTER — OFFICE VISIT (OUTPATIENT)
Dept: FAMILY MEDICINE CLINIC | Facility: CLINIC | Age: 20
End: 2025-04-29
Payer: COMMERCIAL

## 2025-04-29 ENCOUNTER — LAB (OUTPATIENT)
Dept: FAMILY MEDICINE CLINIC | Facility: CLINIC | Age: 20
End: 2025-04-29
Payer: COMMERCIAL

## 2025-04-29 VITALS
DIASTOLIC BLOOD PRESSURE: 80 MMHG | SYSTOLIC BLOOD PRESSURE: 130 MMHG | HEART RATE: 106 BPM | TEMPERATURE: 98 F | WEIGHT: 177.8 LBS | OXYGEN SATURATION: 97 % | HEIGHT: 64 IN | BODY MASS INDEX: 30.35 KG/M2

## 2025-04-29 DIAGNOSIS — R60.0 MILD PERIPHERAL EDEMA: ICD-10-CM

## 2025-04-29 DIAGNOSIS — G47.9 DIFFICULTY SLEEPING: ICD-10-CM

## 2025-04-29 DIAGNOSIS — R60.0 MILD PERIPHERAL EDEMA: Primary | ICD-10-CM

## 2025-04-29 DIAGNOSIS — F41.9 ANXIETY: ICD-10-CM

## 2025-04-29 DIAGNOSIS — G43.009 MIGRAINE WITHOUT AURA AND WITHOUT STATUS MIGRAINOSUS, NOT INTRACTABLE: ICD-10-CM

## 2025-04-29 LAB
ALBUMIN SERPL-MCNC: 4.5 G/DL (ref 3.5–5.2)
ALBUMIN/GLOB SERPL: 1.6 G/DL
ALP SERPL-CCNC: 84 U/L (ref 39–117)
ALT SERPL W P-5'-P-CCNC: 53 U/L (ref 1–33)
ANION GAP SERPL CALCULATED.3IONS-SCNC: 12.7 MMOL/L (ref 5–15)
AST SERPL-CCNC: 30 U/L (ref 1–32)
BASOPHILS # BLD AUTO: 0.05 10*3/MM3 (ref 0–0.2)
BASOPHILS NFR BLD AUTO: 0.9 % (ref 0–1.5)
BILIRUB SERPL-MCNC: 0.9 MG/DL (ref 0–1.2)
BUN SERPL-MCNC: 9 MG/DL (ref 6–20)
BUN/CREAT SERPL: 11 (ref 7–25)
CALCIUM SPEC-SCNC: 9.9 MG/DL (ref 8.6–10.5)
CHLORIDE SERPL-SCNC: 104 MMOL/L (ref 98–107)
CO2 SERPL-SCNC: 22.3 MMOL/L (ref 22–29)
CREAT SERPL-MCNC: 0.82 MG/DL (ref 0.57–1)
DEPRECATED RDW RBC AUTO: 37.6 FL (ref 37–54)
EGFRCR SERPLBLD CKD-EPI 2021: 103.9 ML/MIN/1.73
EOSINOPHIL # BLD AUTO: 0.17 10*3/MM3 (ref 0–0.4)
EOSINOPHIL NFR BLD AUTO: 3 % (ref 0.3–6.2)
ERYTHROCYTE [DISTWIDTH] IN BLOOD BY AUTOMATED COUNT: 12.2 % (ref 12.3–15.4)
GLOBULIN UR ELPH-MCNC: 2.9 GM/DL
GLUCOSE SERPL-MCNC: 95 MG/DL (ref 65–99)
HCT VFR BLD AUTO: 41.7 % (ref 34–46.6)
HGB BLD-MCNC: 13.7 G/DL (ref 12–15.9)
IMM GRANULOCYTES # BLD AUTO: 0.01 10*3/MM3 (ref 0–0.05)
IMM GRANULOCYTES NFR BLD AUTO: 0.2 % (ref 0–0.5)
LYMPHOCYTES # BLD AUTO: 2.37 10*3/MM3 (ref 0.7–3.1)
LYMPHOCYTES NFR BLD AUTO: 42.5 % (ref 19.6–45.3)
MAGNESIUM SERPL-MCNC: 2 MG/DL (ref 1.6–2.6)
MCH RBC QN AUTO: 27.8 PG (ref 26.6–33)
MCHC RBC AUTO-ENTMCNC: 32.9 G/DL (ref 31.5–35.7)
MCV RBC AUTO: 84.8 FL (ref 79–97)
MONOCYTES # BLD AUTO: 0.24 10*3/MM3 (ref 0.1–0.9)
MONOCYTES NFR BLD AUTO: 4.3 % (ref 5–12)
NEUTROPHILS NFR BLD AUTO: 2.74 10*3/MM3 (ref 1.7–7)
NEUTROPHILS NFR BLD AUTO: 49.1 % (ref 42.7–76)
NRBC BLD AUTO-RTO: 0 /100 WBC (ref 0–0.2)
PLATELET # BLD AUTO: 273 10*3/MM3 (ref 140–450)
PMV BLD AUTO: 10.3 FL (ref 6–12)
POTASSIUM SERPL-SCNC: 3.9 MMOL/L (ref 3.5–5.2)
PROT SERPL-MCNC: 7.4 G/DL (ref 6–8.5)
RBC # BLD AUTO: 4.92 10*6/MM3 (ref 3.77–5.28)
SODIUM SERPL-SCNC: 139 MMOL/L (ref 136–145)
T4 FREE SERPL-MCNC: 1.05 NG/DL (ref 0.92–1.68)
TSH SERPL DL<=0.05 MIU/L-ACNC: 1.56 UIU/ML (ref 0.27–4.2)
WBC NRBC COR # BLD AUTO: 5.58 10*3/MM3 (ref 3.4–10.8)

## 2025-04-29 PROCEDURE — 1160F RVW MEDS BY RX/DR IN RCRD: CPT | Performed by: FAMILY MEDICINE

## 2025-04-29 PROCEDURE — 1125F AMNT PAIN NOTED PAIN PRSNT: CPT | Performed by: FAMILY MEDICINE

## 2025-04-29 PROCEDURE — 80050 GENERAL HEALTH PANEL: CPT | Performed by: FAMILY MEDICINE

## 2025-04-29 PROCEDURE — 83735 ASSAY OF MAGNESIUM: CPT | Performed by: FAMILY MEDICINE

## 2025-04-29 PROCEDURE — 1159F MED LIST DOCD IN RCRD: CPT | Performed by: FAMILY MEDICINE

## 2025-04-29 PROCEDURE — 84439 ASSAY OF FREE THYROXINE: CPT | Performed by: FAMILY MEDICINE

## 2025-04-29 PROCEDURE — 36415 COLL VENOUS BLD VENIPUNCTURE: CPT

## 2025-04-29 PROCEDURE — 99214 OFFICE O/P EST MOD 30 MIN: CPT | Performed by: FAMILY MEDICINE

## 2025-04-29 RX ORDER — HYDROXYZINE HYDROCHLORIDE 25 MG/1
25 TABLET, FILM COATED ORAL NIGHTLY PRN
Qty: 90 TABLET | Refills: 0 | Status: SHIPPED | OUTPATIENT
Start: 2025-04-29

## 2025-04-29 RX ORDER — TOPIRAMATE 25 MG/1
25 TABLET, FILM COATED ORAL 2 TIMES DAILY
Qty: 60 TABLET | Refills: 2 | Status: SHIPPED | OUTPATIENT
Start: 2025-04-29

## 2025-04-29 RX ORDER — ESCITALOPRAM OXALATE 10 MG/1
10 TABLET ORAL DAILY
Qty: 30 TABLET | Refills: 2 | Status: SHIPPED | OUTPATIENT
Start: 2025-04-29

## 2025-04-29 RX ORDER — LORATADINE 10 MG/1
10 TABLET ORAL DAILY
Qty: 30 TABLET | Refills: 2 | Status: SHIPPED | OUTPATIENT
Start: 2025-04-29

## 2025-04-30 ENCOUNTER — LAB (OUTPATIENT)
Dept: FAMILY MEDICINE CLINIC | Facility: CLINIC | Age: 20
End: 2025-04-30
Payer: COMMERCIAL

## 2025-04-30 DIAGNOSIS — R60.0 MILD PERIPHERAL EDEMA: ICD-10-CM

## 2025-04-30 LAB
CHOLEST SERPL-MCNC: 158 MG/DL (ref 0–200)
HDLC SERPL-MCNC: 48 MG/DL (ref 40–60)
LDLC SERPL CALC-MCNC: 93 MG/DL (ref 0–100)
LDLC/HDLC SERPL: 1.93 {RATIO}
TRIGL SERPL-MCNC: 88 MG/DL (ref 0–150)
VLDLC SERPL-MCNC: 17 MG/DL (ref 5–40)

## 2025-04-30 PROCEDURE — 80061 LIPID PANEL: CPT | Performed by: FAMILY MEDICINE

## 2025-05-01 ENCOUNTER — RESULTS FOLLOW-UP (OUTPATIENT)
Dept: FAMILY MEDICINE CLINIC | Facility: CLINIC | Age: 20
End: 2025-05-01
Payer: COMMERCIAL

## 2025-05-01 DIAGNOSIS — R79.89 ELEVATED LFTS: Primary | ICD-10-CM

## 2025-05-12 ENCOUNTER — OFFICE VISIT (OUTPATIENT)
Dept: FAMILY MEDICINE CLINIC | Facility: CLINIC | Age: 20
End: 2025-05-12
Payer: COMMERCIAL

## 2025-05-12 VITALS
TEMPERATURE: 97.8 F | DIASTOLIC BLOOD PRESSURE: 70 MMHG | OXYGEN SATURATION: 98 % | HEIGHT: 64 IN | BODY MASS INDEX: 30.7 KG/M2 | HEART RATE: 129 BPM | WEIGHT: 179.8 LBS | SYSTOLIC BLOOD PRESSURE: 122 MMHG

## 2025-05-12 DIAGNOSIS — J01.10 ACUTE NON-RECURRENT FRONTAL SINUSITIS: Primary | ICD-10-CM

## 2025-05-12 PROCEDURE — 1159F MED LIST DOCD IN RCRD: CPT | Performed by: FAMILY MEDICINE

## 2025-05-12 PROCEDURE — 1125F AMNT PAIN NOTED PAIN PRSNT: CPT | Performed by: FAMILY MEDICINE

## 2025-05-12 PROCEDURE — 1160F RVW MEDS BY RX/DR IN RCRD: CPT | Performed by: FAMILY MEDICINE

## 2025-05-12 PROCEDURE — 99213 OFFICE O/P EST LOW 20 MIN: CPT | Performed by: FAMILY MEDICINE

## 2025-05-12 RX ORDER — BROMPHENIRAMINE MALEATE, PSEUDOEPHEDRINE HYDROCHLORIDE, AND DEXTROMETHORPHAN HYDROBROMIDE 2; 30; 10 MG/5ML; MG/5ML; MG/5ML
5 SYRUP ORAL 4 TIMES DAILY PRN
Qty: 118 ML | Refills: 0 | Status: SHIPPED | OUTPATIENT
Start: 2025-05-12

## 2025-05-12 RX ORDER — AZITHROMYCIN 250 MG/1
TABLET, FILM COATED ORAL
Qty: 6 TABLET | Refills: 0 | Status: SHIPPED | OUTPATIENT
Start: 2025-05-12

## 2025-05-12 NOTE — PROGRESS NOTES
"Chief Complaint  Cough    Subjective          Kellie Neville presents to [facility]  Cough    History of Present Illness  The patient is a 20-year-old female who presents with complaints of cough and sore throat.    She reports experiencing symptoms consistent with an upper respiratory infection, including a mild cough that began on Saturday night and a change in her voice that was noticeable as of yesterday. She has not undergone any swab tests. She continues to take Claritin. Her mother had been ill the previous Friday, prompting a visit to the doctor due to concerns of influenza or COVID-19. However, both tests returned negative results, leading the physician to suggest a possible allergic reaction. Her mother had mucus in her throat and was drinking after her, and she got sick. She has previously tolerated Z-Dylan without any adverse effects.    Review of Systems   Respiratory:  Positive for cough.          Objective   Vital Signs:   /70 (BP Location: Right arm, Patient Position: Sitting, Cuff Size: Adult)   Pulse (!) 129   Temp 97.8 °F (36.6 °C) (Temporal)   Ht 162.6 cm (64\")   Wt 81.6 kg (179 lb 12.8 oz)   SpO2 98%   BMI 30.86 kg/m²     Physical Exam  Respiratory: Clear to auscultation, no wheezing, rales or rhonchi    Physical Exam  Constitutional:       General: She is not in acute distress.     Appearance: Normal appearance. She is well-developed and well-groomed. She is not ill-appearing, toxic-appearing or diaphoretic.   HENT:      Head: Normocephalic.      Right Ear: Tympanic membrane, ear canal and external ear normal.      Left Ear: Tympanic membrane, ear canal and external ear normal.      Nose: Nose normal. Congestion and rhinorrhea present.      Mouth/Throat:      Mouth: Mucous membranes are moist.      Pharynx: Oropharynx is clear. No oropharyngeal exudate or posterior oropharyngeal erythema.   Eyes:      General: Lids are normal.         Right eye: No " discharge.         Left eye: No discharge.      Extraocular Movements: Extraocular movements intact.      Pupils: Pupils are equal, round, and reactive to light.   Neck:      Vascular: No carotid bruit.   Cardiovascular:      Rate and Rhythm: Normal rate and regular rhythm.      Pulses: Normal pulses.      Heart sounds: Normal heart sounds. No murmur heard.     No friction rub. No gallop.   Pulmonary:      Effort: Pulmonary effort is normal. No respiratory distress.      Breath sounds: Normal breath sounds. No stridor. No wheezing, rhonchi or rales.   Chest:      Chest wall: No tenderness.   Abdominal:      General: Bowel sounds are normal. There is no distension.      Palpations: Abdomen is soft. There is no mass.      Tenderness: There is no abdominal tenderness. There is no right CVA tenderness, left CVA tenderness, guarding or rebound.      Hernia: No hernia is present.   Musculoskeletal:         General: No swelling or tenderness. Normal range of motion.      Cervical back: Normal range of motion and neck supple. No rigidity or tenderness.      Right lower leg: No edema.      Left lower leg: No edema.   Lymphadenopathy:      Cervical: No cervical adenopathy.   Skin:     General: Skin is warm.      Capillary Refill: Capillary refill takes less than 2 seconds.      Coloration: Skin is not jaundiced.      Findings: No bruising, erythema or rash.   Neurological:      General: No focal deficit present.      Mental Status: She is alert and oriented to person, place, and time.      Motor: Motor function is intact. No weakness.      Coordination: Coordination is intact.      Gait: Gait is intact. Gait normal.   Psychiatric:         Attention and Perception: Attention normal.         Mood and Affect: Mood normal.         Speech: Speech normal.         Behavior: Behavior normal.         Cognition and Memory: Cognition normal.         Judgment: Judgment normal.        Result Review :          Results                 Assessment and Plan    Diagnoses and all orders for this visit:    1. Acute non-recurrent frontal sinusitis (Primary)  -     azithromycin (Zithromax Z-Dylan) 250 MG tablet; Take 2 tablets by mouth on day 1, then 1 tablet daily on days 2-5  Dispense: 6 tablet; Refill: 0  -     brompheniramine-pseudoephedrine-DM 30-2-10 MG/5ML syrup; Take 5 mL by mouth 4 (Four) Times a Day As Needed for Allergies.  Dispense: 118 mL; Refill: 0      Assessment & Plan  1. Upper respiratory infection.  - Reports symptoms of cough and sore throat that began on 05/10/2025.  - Symptoms initially improved but have recurred.  - Physical examination and patient history indicate a likely upper respiratory infection.  - Prescription for Z-Dylan and cough syrup will be sent to the pharmacy.    Patient's Body mass index is 30.86 kg/m². indicating that she is obese (BMI >30). Obesity-related health conditions include the following: none. Obesity is unchanged. BMI is is above average; BMI management plan is completed. We discussed low calorie, low carb based diet program, portion control, and increasing exercise..    Follow Up   Return if symptoms worsen or fail to improve.  Patient was given instructions and counseling regarding her condition or for health maintenance advice. Please see specific information pulled into the AVS if appropriate.     This document has been electronically signed by FANG Woods  May 12, 2025 15:28 EDT     Patient or patient representative verbalized consent for the use of Ambient Listening during the visit with  FANG Woods for chart documentation. 5/12/2025  15:26 EDT

## 2025-07-29 ENCOUNTER — OFFICE VISIT (OUTPATIENT)
Dept: FAMILY MEDICINE CLINIC | Facility: CLINIC | Age: 20
End: 2025-07-29
Payer: COMMERCIAL

## 2025-07-29 VITALS
HEART RATE: 90 BPM | DIASTOLIC BLOOD PRESSURE: 68 MMHG | TEMPERATURE: 96.9 F | SYSTOLIC BLOOD PRESSURE: 110 MMHG | OXYGEN SATURATION: 97 % | WEIGHT: 184 LBS | BODY MASS INDEX: 31.41 KG/M2 | HEIGHT: 64 IN

## 2025-07-29 DIAGNOSIS — F41.9 ANXIETY: ICD-10-CM

## 2025-07-29 DIAGNOSIS — G43.009 MIGRAINE WITHOUT AURA AND WITHOUT STATUS MIGRAINOSUS, NOT INTRACTABLE: ICD-10-CM

## 2025-07-29 PROCEDURE — 99214 OFFICE O/P EST MOD 30 MIN: CPT | Performed by: FAMILY MEDICINE

## 2025-07-29 PROCEDURE — 1126F AMNT PAIN NOTED NONE PRSNT: CPT | Performed by: FAMILY MEDICINE

## 2025-07-29 RX ORDER — HYDROXYZINE HYDROCHLORIDE 50 MG/1
50 TABLET, FILM COATED ORAL 3 TIMES DAILY PRN
Qty: 60 TABLET | Refills: 2 | Status: SHIPPED | OUTPATIENT
Start: 2025-07-29

## 2025-07-29 RX ORDER — TOPIRAMATE 25 MG/1
25 TABLET, FILM COATED ORAL 2 TIMES DAILY
Qty: 60 TABLET | Refills: 2 | Status: SHIPPED | OUTPATIENT
Start: 2025-07-29

## 2025-07-29 RX ORDER — LORATADINE 10 MG/1
10 TABLET ORAL DAILY
Qty: 30 TABLET | Refills: 2 | Status: SHIPPED | OUTPATIENT
Start: 2025-07-29

## 2025-07-29 RX ORDER — ESCITALOPRAM OXALATE 10 MG/1
10 TABLET ORAL DAILY
Qty: 30 TABLET | Refills: 2 | Status: SHIPPED | OUTPATIENT
Start: 2025-07-29

## 2025-07-29 NOTE — PROGRESS NOTES
"Chief Complaint  Insomnia    Subjective          Kellie Neville presents to [facility]  History of Present Illness  History of Present Illness  The patient is a 20-year-old female who presents for a follow-up on headaches.    She reports that her current medication, Topamax, is effectively managing her headache symptoms. She is currently on Topamax.    Review of Systems      Objective   Vital Signs:   /68   Pulse 90   Temp 96.9 °F (36.1 °C) (Temporal)   Ht 162.6 cm (64\")   Wt 83.5 kg (184 lb)   SpO2 97%   BMI 31.58 kg/m²     Physical Exam  Cardiovascular: Regular rate and rhythm, no murmurs, rubs, or gallops    Physical Exam  Constitutional:       General: She is not in acute distress.     Appearance: Normal appearance. She is well-developed and well-groomed. She is not ill-appearing, toxic-appearing or diaphoretic.   HENT:      Head: Normocephalic.      Nose: Nose normal. No congestion or rhinorrhea.      Mouth/Throat:      Mouth: Mucous membranes are moist.      Pharynx: Oropharynx is clear. No oropharyngeal exudate or posterior oropharyngeal erythema.   Eyes:      General: Lids are normal.         Right eye: No discharge.         Left eye: No discharge.      Extraocular Movements: Extraocular movements intact.      Pupils: Pupils are equal, round, and reactive to light.   Neck:      Vascular: No carotid bruit.   Cardiovascular:      Rate and Rhythm: Normal rate and regular rhythm.      Pulses: Normal pulses.      Heart sounds: Normal heart sounds. No murmur heard.     No friction rub. No gallop.   Pulmonary:      Effort: Pulmonary effort is normal. No respiratory distress.      Breath sounds: Normal breath sounds. No stridor. No wheezing, rhonchi or rales.   Chest:      Chest wall: No tenderness.   Abdominal:      General: Bowel sounds are normal. There is no distension.      Palpations: Abdomen is soft. There is no mass.      Tenderness: There is no abdominal " tenderness. There is no right CVA tenderness, left CVA tenderness, guarding or rebound.      Hernia: No hernia is present.   Musculoskeletal:         General: No swelling or tenderness. Normal range of motion.      Cervical back: Normal range of motion and neck supple. No rigidity or tenderness.      Right lower leg: No edema.      Left lower leg: No edema.   Lymphadenopathy:      Cervical: No cervical adenopathy.   Skin:     General: Skin is warm.      Capillary Refill: Capillary refill takes less than 2 seconds.      Coloration: Skin is not jaundiced.      Findings: No bruising, erythema or rash.   Neurological:      General: No focal deficit present.      Mental Status: She is alert and oriented to person, place, and time.      Motor: Motor function is intact. No weakness.      Coordination: Coordination is intact.      Gait: Gait is intact. Gait normal.   Psychiatric:         Attention and Perception: Attention normal.         Mood and Affect: Mood normal.         Speech: Speech normal.         Behavior: Behavior normal.         Cognition and Memory: Cognition normal.         Judgment: Judgment normal.        Result Review :          Results                Assessment and Plan    Diagnoses and all orders for this visit:    1. Anxiety  -     escitalopram (Lexapro) 10 MG tablet; Take 1 tablet by mouth Daily.  Dispense: 30 tablet; Refill: 2    2. Migraine without aura and without status migrainosus, not intractable  -     topiramate (Topamax) 25 MG tablet; Take 1 tablet by mouth 2 (Two) Times a Day.  Dispense: 60 tablet; Refill: 2    Other orders  -     hydrOXYzine (ATARAX) 50 MG tablet; Take 1 tablet by mouth 3 (Three) Times a Day As Needed for Itching.  Dispense: 60 tablet; Refill: 2  -     loratadine (Claritin) 10 MG tablet; Take 1 tablet by mouth Daily.  Dispense: 30 tablet; Refill: 2      Assessment & Plan  1. Headaches.  - Headaches are currently managed with Topamax.  - Blood pressure and heart rate are  within normal limits.  - Dosage of Topamax will be increased to 50 mg to further control symptoms.  - Medication sent to pharmacy.    2. Depression.  - Lexapro is reported to be effective.  - Patient requests refills for Lexapro.  - Medication sent to pharmacy.    Patient's Body mass index is 31.58 kg/m². indicating that she is obese (BMI >30). Obesity-related health conditions include the following: none. Obesity is unchanged. BMI is is above average; BMI management plan is completed. We discussed low calorie, low carb based diet program, portion control, and increasing exercise..    Follow Up   Return in about 3 months (around 10/29/2025).  Patient was given instructions and counseling regarding her condition or for health maintenance advice. Please see specific information pulled into the AVS if appropriate.     This document has been electronically signed by FANG Woods  July 29, 2025 10:14 EDT     Patient or patient representative verbalized consent for the use of Ambient Listening during the visit with  FANG Woods for chart documentation. 7/29/2025  11:14 EDT